# Patient Record
Sex: FEMALE | Race: WHITE | Employment: FULL TIME | URBAN - METROPOLITAN AREA
[De-identification: names, ages, dates, MRNs, and addresses within clinical notes are randomized per-mention and may not be internally consistent; named-entity substitution may affect disease eponyms.]

---

## 2023-10-07 PROBLEM — E55.9 VITAMIN D DEFICIENCY: Status: ACTIVE | Noted: 2023-10-07

## 2023-10-07 PROBLEM — L20.9 ATOPIC DERMATITIS: Status: ACTIVE | Noted: 2023-10-07

## 2023-10-07 PROBLEM — H61.21 IMPACTED CERUMEN OF RIGHT EAR: Status: ACTIVE | Noted: 2023-10-07

## 2023-10-07 PROBLEM — G43.909 MIGRAINE WITHOUT STATUS MIGRAINOSUS, NOT INTRACTABLE: Status: ACTIVE | Noted: 2023-10-07

## 2023-10-07 PROBLEM — R53.83 FATIGUE: Status: ACTIVE | Noted: 2023-10-07

## 2023-10-07 PROBLEM — F41.9 ANXIETY: Status: ACTIVE | Noted: 2023-10-07

## 2023-10-09 RX ORDER — SERTRALINE HYDROCHLORIDE 50 MG/1
1.5 TABLET, FILM COATED ORAL DAILY
COMMUNITY
Start: 2021-03-22 | End: 2023-10-19 | Stop reason: SDUPTHER

## 2023-10-09 RX ORDER — NYSTATIN 100000 U/G
OINTMENT TOPICAL
COMMUNITY
Start: 2022-10-18 | End: 2023-10-19 | Stop reason: ALTCHOICE

## 2023-10-09 RX ORDER — SUMATRIPTAN 50 MG/1
TABLET, FILM COATED ORAL
COMMUNITY
Start: 2013-01-11 | End: 2023-10-19 | Stop reason: SDUPTHER

## 2023-10-19 ENCOUNTER — OFFICE VISIT (OUTPATIENT)
Dept: PRIMARY CARE | Facility: CLINIC | Age: 38
End: 2023-10-19
Payer: COMMERCIAL

## 2023-10-19 VITALS
WEIGHT: 154 LBS | HEART RATE: 68 BPM | OXYGEN SATURATION: 100 % | DIASTOLIC BLOOD PRESSURE: 70 MMHG | BODY MASS INDEX: 27.29 KG/M2 | HEIGHT: 63 IN | SYSTOLIC BLOOD PRESSURE: 116 MMHG

## 2023-10-19 DIAGNOSIS — Z13.220 SCREENING, LIPID: ICD-10-CM

## 2023-10-19 DIAGNOSIS — Z13.31 NEGATIVE DEPRESSION SCREENING: ICD-10-CM

## 2023-10-19 DIAGNOSIS — F41.9 ANXIETY: Primary | ICD-10-CM

## 2023-10-19 DIAGNOSIS — G43.009 MIGRAINE WITHOUT AURA AND WITHOUT STATUS MIGRAINOSUS, NOT INTRACTABLE: ICD-10-CM

## 2023-10-19 DIAGNOSIS — Z00.00 HEALTHCARE MAINTENANCE: ICD-10-CM

## 2023-10-19 DIAGNOSIS — E55.9 VITAMIN D DEFICIENCY: ICD-10-CM

## 2023-10-19 DIAGNOSIS — R53.83 OTHER FATIGUE: ICD-10-CM

## 2023-10-19 PROCEDURE — 1036F TOBACCO NON-USER: CPT | Performed by: NURSE PRACTITIONER

## 2023-10-19 PROCEDURE — 99395 PREV VISIT EST AGE 18-39: CPT | Performed by: NURSE PRACTITIONER

## 2023-10-19 PROCEDURE — 96127 BRIEF EMOTIONAL/BEHAV ASSMT: CPT | Performed by: NURSE PRACTITIONER

## 2023-10-19 RX ORDER — SUMATRIPTAN 50 MG/1
50 TABLET, FILM COATED ORAL ONCE AS NEEDED
Qty: 9 TABLET | Refills: 5 | Status: SHIPPED | OUTPATIENT
Start: 2023-10-19

## 2023-10-19 RX ORDER — SERTRALINE HYDROCHLORIDE 50 MG/1
75 TABLET, FILM COATED ORAL DAILY
Qty: 135 TABLET | Refills: 3 | Status: SHIPPED | OUTPATIENT
Start: 2023-10-19 | End: 2024-01-22 | Stop reason: ALTCHOICE

## 2023-10-19 ASSESSMENT — PATIENT HEALTH QUESTIONNAIRE - PHQ9
1. LITTLE INTEREST OR PLEASURE IN DOING THINGS: NOT AT ALL
2. FEELING DOWN, DEPRESSED OR HOPELESS: NOT AT ALL
SUM OF ALL RESPONSES TO PHQ9 QUESTIONS 1 & 2: 0

## 2023-10-19 NOTE — PROGRESS NOTES
Annual Comprehensive Medical Exam:    38 y.o. female presents for annual comprehensive medical evaluation and preventive services screening.       Diet: healthy  Caffeine: 1-2 per day   Water: couple per day  Exercise: walking  Alcohol:   Tobacco: denies  Dentist: twice a year  Optometrist: yearly  Mammogram: never   Pelvic and Pap: Dr. Kamila Campa      Family history and social history reviewed.   Allowed to report any questions or concerns.     Delivered her 2nd  baby boy, Gonzalez,  vaginally 9/2/23. Bottle feeding.  Evaluated 9/23/23 for preeclampsia in postpartum period.     Anxiety:   Managed with Sertraline (Zoloft) 50 mg 1 1/2 tabs  Meeting with a counselor every 2 weeks  Denies thoughts of suicide or homicide.      Migraines:   Well controlled.   Using Sumatriptan if needed         Past Medical History:   Diagnosis Date    Acute pharyngitis, unspecified 02/19/2013    Pharyngitis    Anxiety disorder, unspecified 10/18/2022    Anxiety    Cellulitis of right axilla     Cellulitis of axilla, right    Encounter for other general counseling and advice on contraception     Family planning    Generalized hyperhidrosis     Hyperhidrosis    Hemangioma unspecified site     Hemangioma    Iron deficiency anemia, unspecified 02/19/2013    Iron deficiency anemia    Irritable bowel syndrome without diarrhea 02/19/2013    Irritable bowel syndrome    Migraine, unspecified, not intractable, without status migrainosus 10/18/2022    Migraine without status migrainosus, not intractable    Other malaise     Malaise    Personal history of other (healed) physical injury and trauma     History of sprain of wrist    Personal history of other diseases of the nervous system and sense organs     History of migraine headaches    Personal history of other diseases of the nervous system and sense organs     History of conjunctivitis    Personal history of other diseases of the respiratory system     History of bronchitis    Personal history  of other diseases of the respiratory system 06/26/2014    History of viral pharyngitis    Personal history of other diseases of the respiratory system     Personal history of sinusitis    Personal history of other infectious and parasitic diseases 05/14/2013    History of tinea corporis    Personal history of other mental and behavioral disorders 04/29/2013    History of depression    Personal history of other specified conditions     History of fatigue    Personal history of urinary (tract) infections     History of urinary tract infection    Right lower quadrant pain     RLQ abdominal pain    Unspecified conjunctivitis 09/04/2014    Conjunctivitis of left eye      Past Surgical History:   Procedure Laterality Date    MOUTH SURGERY  08/27/2013    Oral Surgery Tooth Extraction    OTHER SURGICAL HISTORY  09/21/2020    Exploratory laparoscopy     Family History   Problem Relation Name Age of Onset    Arthritis Mother      Hypertension Father      Diabetes Mother's Brother        Social History     Socioeconomic History    Marital status:      Spouse name: Not on file    Number of children: 2    Years of education: Not on file    Highest education level: Not on file   Occupational History    Not on file   Tobacco Use    Smoking status: Never    Smokeless tobacco: Never   Substance and Sexual Activity    Alcohol use: Yes     Alcohol/week: 3.0 standard drinks of alcohol     Types: 3 Standard drinks or equivalent per week    Drug use: Never    Sexual activity: Not on file   Other Topics Concern    Not on file   Social History Narrative    Not on file     Social Determinants of Health     Financial Resource Strain: Not on file   Food Insecurity: Not on file   Transportation Needs: Not on file   Physical Activity: Not on file   Stress: Not on file   Social Connections: Not on file   Intimate Partner Violence: Not on file   Housing Stability: Not on file       Current Outpatient Medications on File Prior to Visit  "  Medication Sig Dispense Refill    sertraline (Zoloft) 50 mg tablet Take 1.5 tablets (75 mg) by mouth once daily.      SUMAtriptan (Imitrex) 50 mg tablet Take by mouth.      [DISCONTINUED] nystatin (Mycostatin) ointment Apply underbreast for tx of candiadiasis 2-3  times per day       No current facility-administered medications on file prior to visit.       No Known Allergies    ROS:   Refer to HPI  Denies fever, CP, SOB, headaches or GI upset    Visit Vitals  /70   Pulse 68   Ht 1.6 m (5' 3\")   Wt 69.9 kg (154 lb)   SpO2 100%   BMI 27.28 kg/m²   Smoking Status Never   BSA 1.76 m²        Physical Exam  Gen: Alert and oriented x3 female in no acute distress.  HEENT: Head is normocephalic.  Pupils equal and reactive to light.  Tympanic membranes are clear.  Neck is supple without adenopathy or carotid bruits.  Heart: Regular rate and rhythm without murmurs.  Lungs: Clear to auscultation bilaterally.  Breast:       Deferred to Gyn  Pelvic:           Deferred to Gyn   Abdomen: Soft with normal bowel sounds.  No masses or pain to palpation.  No bruits auscultated.  Extremities: Good range of motion of all joints.  No significant edema. Pedal pulses +1-2/4  Neuro: No signs of focal neurologic deficit.  No tremor.  Speech and hearing are normal.  DTRs +3/4;  Muscle Strength +5/5.  Musculoskeletal: Spine with good ROM.  Leg lengths are equal.  Skin: No significant or irregular nevi visualized.  Psych: normal affect.  No suicidal ideation.  Good judgment and insight.    Diagnosis/Plan:     1. Healthcare maintenance    - Comprehensive metabolic panel; Future  - CBC; Future  - Lipid panel; Future  - TSH with reflex to Free T4 if abnormal; Future  - Urinalysis with Reflex Microscopic; Future    2. Anxiety  Stable.   Continue current medication/treatment plan.     Aware at any time if thoughts of suicide or homicide are present contact medical services immediately.    - sertraline (Zoloft) 50 mg tablet; Take 1.5 tablets " (75 mg) by mouth once daily.  Dispense: 135 tablet; Refill: 3    3. Migraine without aura and without status migrainosus, not intractable  Stable.  Continue current medications.  - SUMAtriptan (Imitrex) 50 mg tablet; Take 1 tablet (50 mg) by mouth 1 time if needed for migraine for up to 1 dose.  Dispense: 9 tablet; Refill: 5    4. Vitamin D deficiency  A prescription for vitamin D will be sent to your pharmacy.  Begin or continue a multivitamin.    - Vitamin D 25-Hydroxy,Total (for eval of Vitamin D levels); Future    5. Negative depression screening:   Depression screening completed.       Medications refills will be completed as discussed.     Any labs or testing that is ordered will be reviewed and the results will be in your chart .   You can review these via  [employer].     Follow up 1 year for CPE  Prescriptions will not be filled unless you are compliant with your follow-up appointments or have a follow-up appointment scheduled as per the instruction of your provider. Refills for medications should be requested at the time of your office visit.     Please allow one week for refill requests to be completed.     Contact office with any questions or concerns.   Preferred communication is via  [employer]  Please contact [email or web address] if having issues with  [employer]    Kandace ROSS-[facility]  [address], [address]  [place] [address]  Phone: [phone]    **Charting was completed using voice recognition technology and may include unintended errors**

## 2024-01-18 PROBLEM — H53.9 ABNORMAL VISION: Status: ACTIVE | Noted: 2023-09-23

## 2024-01-18 PROBLEM — Z98.890 H/O ABDOMINAL SURGERY: Status: ACTIVE | Noted: 2020-08-31

## 2024-01-18 PROBLEM — Z86.69 HISTORY OF MIGRAINE: Status: ACTIVE | Noted: 2023-09-23

## 2024-01-18 PROBLEM — O09.521 AMA (ADVANCED MATERNAL AGE) MULTIGRAVIDA 35+, FIRST TRIMESTER (HHS-HCC): Status: ACTIVE | Noted: 2023-01-24

## 2024-01-18 PROBLEM — J45.909 ASTHMA (HHS-HCC): Status: ACTIVE | Noted: 2023-09-23

## 2024-01-18 PROBLEM — Z86.59 HISTORY OF DEPRESSION: Status: ACTIVE | Noted: 2023-09-23

## 2024-01-22 ENCOUNTER — OFFICE VISIT (OUTPATIENT)
Dept: PRIMARY CARE | Facility: CLINIC | Age: 39
End: 2024-01-22
Payer: COMMERCIAL

## 2024-01-22 VITALS
SYSTOLIC BLOOD PRESSURE: 124 MMHG | OXYGEN SATURATION: 97 % | DIASTOLIC BLOOD PRESSURE: 76 MMHG | WEIGHT: 156 LBS | BODY MASS INDEX: 27.63 KG/M2 | HEART RATE: 76 BPM

## 2024-01-22 DIAGNOSIS — F41.9 ANXIETY: Primary | ICD-10-CM

## 2024-01-22 PROBLEM — O09.521 AMA (ADVANCED MATERNAL AGE) MULTIGRAVIDA 35+, FIRST TRIMESTER (HHS-HCC): Status: RESOLVED | Noted: 2023-01-24 | Resolved: 2024-01-22

## 2024-01-22 PROBLEM — Z98.890 H/O ABDOMINAL SURGERY: Status: RESOLVED | Noted: 2020-08-31 | Resolved: 2024-01-22

## 2024-01-22 PROBLEM — Z86.59 HISTORY OF DEPRESSION: Status: RESOLVED | Noted: 2023-09-23 | Resolved: 2024-01-22

## 2024-01-22 PROCEDURE — 99213 OFFICE O/P EST LOW 20 MIN: CPT | Performed by: NURSE PRACTITIONER

## 2024-01-22 PROCEDURE — 1036F TOBACCO NON-USER: CPT | Performed by: NURSE PRACTITIONER

## 2024-01-22 RX ORDER — SERTRALINE HYDROCHLORIDE 100 MG/1
100 TABLET, FILM COATED ORAL DAILY
Qty: 90 TABLET | Refills: 1 | Status: SHIPPED | OUTPATIENT
Start: 2024-01-22

## 2024-01-22 RX ORDER — NORETHINDRONE 0.35 MG/1
1 TABLET ORAL DAILY
COMMUNITY

## 2024-01-22 NOTE — PROGRESS NOTES
Subjective   Patient ID: Radha Rosales is a 38 y.o. female who presents for Anxiety (Discuss increasing medication).    HPI   Pt states that over the past few weeks her anxiety has increased.   She has started back on birth control with has affected her hormones.   Both of her babies were sick for at least +3 days  She has been speaking with her therapist  Denies thoughts of suicide or homicide      Review of Systems    Objective   /76   Pulse 76   Wt 70.8 kg (156 lb)   SpO2 97%   BMI 27.63 kg/m²     Physical Exam    Alert and oriented x 3  Appears healthy  Does not appear/sound dyspneic with conversation  Speech clear.  Hearing adequate.  Psych: Normal affect. Good judgment and insight.       Assessment/Plan     1. Anxiety  Discussed ways to help with anxiety  Continue to meet with counselor  Aware at any time if thoughts of suicide or homicide are present contact medical services immediately.    - sertraline (Zoloft) 100 mg tablet; Take 1 tablet (100 mg) by mouth once daily.  Dispense: 90 tablet; Refill: 1      Medications refills will be completed as discussed.     Any labs or testing that is ordered will be reviewed and the results will be in your chart .   You can review these via  [employer].     Prescriptions will not be filled unless you are compliant with your follow-up appointments or have a follow-up appointment scheduled as per the instruction of your provider. Refills for medications should be requested at the time of your office visit.     Please allow one week for refill requests to be completed.     Contact office with any questions or concerns.   Preferred communication is via  [employer]  Please contact [email or web address] if having issues with  [employer]    Kandace Gaviria APRN-[facility]  [address], [address]  [place] [address]  Phone: [phone]    **Charting was completed using voice recognition technology and may  include unintended errors**

## 2024-02-28 ENCOUNTER — OFFICE VISIT (OUTPATIENT)
Dept: PRIMARY CARE | Facility: CLINIC | Age: 39
End: 2024-02-28
Payer: COMMERCIAL

## 2024-02-28 VITALS
HEART RATE: 79 BPM | SYSTOLIC BLOOD PRESSURE: 110 MMHG | OXYGEN SATURATION: 98 % | WEIGHT: 153 LBS | BODY MASS INDEX: 27.11 KG/M2 | HEIGHT: 63 IN | DIASTOLIC BLOOD PRESSURE: 62 MMHG

## 2024-02-28 DIAGNOSIS — J45.20 MILD INTERMITTENT ASTHMA WITHOUT COMPLICATION (HHS-HCC): ICD-10-CM

## 2024-02-28 DIAGNOSIS — N39.0 URINARY TRACT INFECTION WITHOUT HEMATURIA, SITE UNSPECIFIED: Primary | ICD-10-CM

## 2024-02-28 DIAGNOSIS — R30.0 DYSURIA: ICD-10-CM

## 2024-02-28 LAB
POC APPEARANCE, URINE: ABNORMAL
POC BILIRUBIN, URINE: NEGATIVE
POC BLOOD, URINE: ABNORMAL
POC COLOR, URINE: ABNORMAL
POC GLUCOSE, URINE: NEGATIVE MG/DL
POC KETONES, URINE: NEGATIVE MG/DL
POC LEUKOCYTES, URINE: ABNORMAL
POC NITRITE,URINE: NEGATIVE
POC PH, URINE: 6.5 PH
POC PROTEIN, URINE: ABNORMAL MG/DL
POC SPECIFIC GRAVITY, URINE: >=1.03
POC UROBILINOGEN, URINE: 0.2 EU/DL

## 2024-02-28 PROCEDURE — 1036F TOBACCO NON-USER: CPT | Performed by: NURSE PRACTITIONER

## 2024-02-28 PROCEDURE — 81003 URINALYSIS AUTO W/O SCOPE: CPT | Performed by: NURSE PRACTITIONER

## 2024-02-28 PROCEDURE — 87086 URINE CULTURE/COLONY COUNT: CPT

## 2024-02-28 PROCEDURE — 99214 OFFICE O/P EST MOD 30 MIN: CPT | Performed by: NURSE PRACTITIONER

## 2024-02-28 RX ORDER — NITROFURANTOIN 25; 75 MG/1; MG/1
100 CAPSULE ORAL 2 TIMES DAILY
Qty: 10 CAPSULE | Refills: 0 | Status: SHIPPED | OUTPATIENT
Start: 2024-02-28 | End: 2024-03-04

## 2024-02-28 ASSESSMENT — PATIENT HEALTH QUESTIONNAIRE - PHQ9
2. FEELING DOWN, DEPRESSED OR HOPELESS: NOT AT ALL
1. LITTLE INTEREST OR PLEASURE IN DOING THINGS: NOT AT ALL
SUM OF ALL RESPONSES TO PHQ9 QUESTIONS 1 & 2: 0

## 2024-02-28 NOTE — PROGRESS NOTES
"Subjective   Patient ID: Radha Rosales is a 38 y.o. female who presents for possible UTI.    HPI     Presents today for acute care visit.    She has been complaining of the following since:   2/22/24    Burning with urination: yes  Urinary frequency: yes  Urinary urgency: yes  Hematuria: finishing period  Back pain: denies  Nausea:denies  Vomiting: denies  Fever.chills: denies  Vaginal discharge: finishing menses    Treatments tried:   Increased water  Cranberry juice    Asthma:   Stable   Review of Systems    Objective   /62   Pulse 79   Ht 1.6 m (5' 3\")   Wt 69.4 kg (153 lb)   SpO2 98%   BMI 27.10 kg/m²     Physical Exam    Alert and oriented x 3  Appears healthy  Speech clear.  Hearing adequate.  Psych: Normal affect. Good judgment and insight.     Reviewed UA results     Assessment/Plan     1. Dysuria, Urinary tract infection without hematuria, site unspecified  An antibiotic will be prescribed. Full dose should be completed.   Eat yogurt or take a probiotic (not within the hour of taking the antibiotic) to assist with stomach upset.   A urine culture and sensitivity will be reviewed and the patient will be notified if antibiotic changes are appropriate.   Increase fluid intake throughout the day.   You can drink cranberry juice or take cranberry tablets and/or Vitamin C tablets.   Discussed proper personal hygiene techniques and the need to empty bladder frequently.   Aware to call office if symptoms do not resolve in 3 days.    - POCT UA Automated manually resulted  - Urine Culture; Future  - Urine Culture  - nitrofurantoin, macrocrystal-monohydrate, (Macrobid) 100 mg capsule; Take 1 capsule (100 mg) by mouth 2 times a day for 5 days.  Dispense: 10 capsule; Refill: 0    2. Mild intermittent asthma without complication  stable      Contact office with any questions or concerns.   Preferred communication is via  [employer]  Please contact [email or web address] if having issues with  " Karl Gaviria [facility]  [address], [address]  [place] [address]  Phone: [phone]    **Charting was completed using voice recognition technology and may include unintended errors**

## 2024-02-29 LAB — BACTERIA UR CULT: NORMAL

## 2024-07-23 ENCOUNTER — TELEPHONE (OUTPATIENT)
Dept: PRIMARY CARE | Facility: CLINIC | Age: 39
End: 2024-07-23
Payer: COMMERCIAL

## 2024-07-23 DIAGNOSIS — F41.9 ANXIETY: ICD-10-CM

## 2024-07-23 RX ORDER — SERTRALINE HYDROCHLORIDE 100 MG/1
100 TABLET, FILM COATED ORAL DAILY
Qty: 90 TABLET | Refills: 0 | Status: SHIPPED | OUTPATIENT
Start: 2024-07-23

## 2024-07-23 NOTE — TELEPHONE ENCOUNTER
REFILL  MEDICATION:     Sertraline 100 MG; Take 1 tablet once daily.     PHARM: [facility]   PHARM NUMBER: [phone]    LR: 1/22/24      90 tablets with 1 refill   LV: 1/22/24  NV: No future appt.

## 2024-07-31 ENCOUNTER — APPOINTMENT (OUTPATIENT)
Dept: PRIMARY CARE | Facility: CLINIC | Age: 39
End: 2024-07-31
Payer: COMMERCIAL

## 2024-07-31 VITALS
SYSTOLIC BLOOD PRESSURE: 124 MMHG | DIASTOLIC BLOOD PRESSURE: 72 MMHG | HEART RATE: 72 BPM | HEIGHT: 63 IN | WEIGHT: 152 LBS | BODY MASS INDEX: 26.93 KG/M2 | OXYGEN SATURATION: 99 %

## 2024-07-31 DIAGNOSIS — S16.1XXA STRAIN OF NECK MUSCLE, INITIAL ENCOUNTER: Primary | ICD-10-CM

## 2024-07-31 DIAGNOSIS — H53.9 VISION ABNORMALITIES: ICD-10-CM

## 2024-07-31 PROCEDURE — 99214 OFFICE O/P EST MOD 30 MIN: CPT | Performed by: NURSE PRACTITIONER

## 2024-07-31 PROCEDURE — 3008F BODY MASS INDEX DOCD: CPT | Performed by: NURSE PRACTITIONER

## 2024-07-31 RX ORDER — KETOCONAZOLE 20 MG/ML
SHAMPOO, SUSPENSION TOPICAL
COMMUNITY
Start: 2024-07-24

## 2024-07-31 RX ORDER — AMMONIUM LACTATE 12 G/100G
LOTION TOPICAL
COMMUNITY
Start: 2024-07-24

## 2024-07-31 RX ORDER — CLOTRIMAZOLE AND BETAMETHASONE DIPROPIONATE 10; .64 MG/G; MG/G
CREAM TOPICAL
COMMUNITY
Start: 2024-07-24

## 2024-07-31 RX ORDER — PREDNISONE 20 MG/1
40 TABLET ORAL DAILY
Qty: 10 TABLET | Refills: 0 | Status: SHIPPED | OUTPATIENT
Start: 2024-07-31 | End: 2024-08-05

## 2024-07-31 RX ORDER — CYCLOBENZAPRINE HCL 10 MG
10 TABLET ORAL NIGHTLY PRN
Qty: 30 TABLET | Refills: 0 | Status: SHIPPED | OUTPATIENT
Start: 2024-07-31 | End: 2024-09-29

## 2024-07-31 RX ORDER — CLINDAMYCIN PHOSPHATE 10 UG/ML
LOTION TOPICAL
COMMUNITY
Start: 2024-07-24

## 2024-07-31 ASSESSMENT — PATIENT HEALTH QUESTIONNAIRE - PHQ9
1. LITTLE INTEREST OR PLEASURE IN DOING THINGS: NOT AT ALL
SUM OF ALL RESPONSES TO PHQ9 QUESTIONS 1 & 2: 0
2. FEELING DOWN, DEPRESSED OR HOPELESS: NOT AT ALL

## 2024-07-31 NOTE — PROGRESS NOTES
"Subjective   Patient ID: Radha Rosales is a 39 y.o. female who presents for left sided neck pain.    HPI     Past couple months she has been having discomfort to the posterior left side of her neck.   States she just woke up and noticed pain  Area can feel stiff at times   Certain neck movements causes her to feel a pulling sensation of her neck   She did not injury herself  Right handed    Experienced a \"flash\" in her left lateral eye  She was evaluated by eye specialist-no cause of flash reported  Still can see a flash when she looks far to left    Past Medical History:   Diagnosis Date    Acute pharyngitis, unspecified 02/19/2013    Pharyngitis    AMA (advanced maternal age) multigravida 35+, first trimester ([facility]) 01/24/2023    Anxiety disorder, unspecified 10/18/2022    Anxiety    Cellulitis of right axilla     Cellulitis of axilla, right    Eclampsia in puerperium ([facility]) 09/23/2023    Encounter for other general counseling and advice on contraception     Family planning    Generalized hyperhidrosis     Hyperhidrosis    H/O abdominal surgery 08/31/2020    Formatting of this note might be different from the original. G1 - 11 week was suspect to have heterotopic, s/p MRI and laparoscopy - no heterotopic Laparoscopic    Hemangioma unspecified site     Hemangioma    History of depression 09/23/2023    Formatting of this note might be different from the original. Comment on above: Added by Problem List Migration; 2013-2-19;    Iron deficiency anemia, unspecified 02/19/2013    Iron deficiency anemia    Irritable bowel syndrome without diarrhea 02/19/2013    Irritable bowel syndrome    Migraine, unspecified, not intractable, without status migrainosus 10/18/2022    Migraine without status migrainosus, not intractable    Other malaise     Malaise    Personal history of other (healed) physical injury and trauma     History of sprain of wrist    Personal history of other diseases of the nervous system and " "sense organs     History of migraine headaches    Personal history of other diseases of the nervous system and sense organs     History of conjunctivitis    Personal history of other diseases of the respiratory system     History of bronchitis    Personal history of other diseases of the respiratory system 06/26/2014    History of viral pharyngitis    Personal history of other diseases of the respiratory system     Personal history of sinusitis    Personal history of other infectious and parasitic diseases 05/14/2013    History of tinea corporis    Personal history of other mental and behavioral disorders 04/29/2013    History of depression    Personal history of other specified conditions     History of fatigue    Personal history of urinary (tract) infections     History of urinary tract infection    Right lower quadrant pain     RLQ abdominal pain    Unspecified conjunctivitis 09/04/2014    Conjunctivitis of left eye         Review of Systems    Objective   /72   Pulse 72   Ht 1.6 m (5' 3\")   Wt 68.9 kg (152 lb)   SpO2 99%   BMI 26.93 kg/m²     Physical Exam    Alert and oriented x 3  Appears healthy  Does not appear/sound dyspneic with conversation  Speech clear.  Hearing adequate.  Psych: Normal affect. Good judgment and insight.   Full ROM of neck but experiences discomfort  Strength equal in both arms    Assessment/Plan        1. Strain of neck muscle, initial encounter  - predniSONE (Deltasone) 20 mg tablet; Take 2 tablets (40 mg) by mouth once daily for 5 days.  Dispense: 10 tablet; Refill: 0  - cyclobenzaprine (Flexeril) 10 mg tablet; Take 1 tablet (10 mg) by mouth as needed at bedtime for muscle spasms.  Dispense: 30 tablet; Refill: 0  - Referral to Physical Therapy; Future    2. Vision abnormalities  Follow up with eye specialist     Contact office with any questions or concerns.   Preferred communication is via  [employer] [address]      Call  Services: [phone] to assist with " scheduling.      Kandace Gaviria [facility]  [address], [address]  [place] [address]  Phone: [phone]    **Charting was completed using voice recognition technology and may include unintended errors**

## 2024-10-25 ENCOUNTER — PATIENT MESSAGE (OUTPATIENT)
Dept: PRIMARY CARE | Facility: CLINIC | Age: 39
End: 2024-10-25
Payer: COMMERCIAL

## 2024-10-25 DIAGNOSIS — F41.9 ANXIETY: ICD-10-CM

## 2024-10-26 RX ORDER — SERTRALINE HYDROCHLORIDE 100 MG/1
100 TABLET, FILM COATED ORAL DAILY
Qty: 90 TABLET | Refills: 0 | Status: SHIPPED | OUTPATIENT
Start: 2024-10-26

## 2024-11-06 ENCOUNTER — APPOINTMENT (OUTPATIENT)
Dept: PHYSICAL THERAPY | Facility: CLINIC | Age: 39
End: 2024-11-06
Payer: COMMERCIAL

## 2024-11-13 ENCOUNTER — APPOINTMENT (OUTPATIENT)
Dept: PHYSICAL THERAPY | Facility: CLINIC | Age: 39
End: 2024-11-13
Payer: COMMERCIAL

## 2024-11-20 ENCOUNTER — APPOINTMENT (OUTPATIENT)
Dept: PHYSICAL THERAPY | Facility: CLINIC | Age: 39
End: 2024-11-20
Payer: COMMERCIAL

## 2024-12-04 ENCOUNTER — APPOINTMENT (OUTPATIENT)
Dept: PHYSICAL THERAPY | Facility: CLINIC | Age: 39
End: 2024-12-04
Payer: COMMERCIAL

## 2024-12-19 ENCOUNTER — APPOINTMENT (OUTPATIENT)
Dept: PRIMARY CARE | Facility: CLINIC | Age: 39
End: 2024-12-19
Payer: COMMERCIAL

## 2024-12-30 ENCOUNTER — APPOINTMENT (OUTPATIENT)
Dept: PRIMARY CARE | Facility: CLINIC | Age: 39
End: 2024-12-30
Payer: COMMERCIAL

## 2024-12-30 VITALS
BODY MASS INDEX: 28.17 KG/M2 | OXYGEN SATURATION: 99 % | HEIGHT: 63 IN | DIASTOLIC BLOOD PRESSURE: 60 MMHG | WEIGHT: 159 LBS | SYSTOLIC BLOOD PRESSURE: 112 MMHG | HEART RATE: 87 BPM

## 2024-12-30 DIAGNOSIS — Z11.59 NEED FOR HEPATITIS C SCREENING TEST: ICD-10-CM

## 2024-12-30 DIAGNOSIS — Z00.00 HEALTHCARE MAINTENANCE: Primary | ICD-10-CM

## 2024-12-30 DIAGNOSIS — J06.9 UPPER RESPIRATORY TRACT INFECTION, UNSPECIFIED TYPE: ICD-10-CM

## 2024-12-30 DIAGNOSIS — F41.9 ANXIETY: ICD-10-CM

## 2024-12-30 DIAGNOSIS — G43.009 MIGRAINE WITHOUT AURA AND WITHOUT STATUS MIGRAINOSUS, NOT INTRACTABLE: ICD-10-CM

## 2024-12-30 DIAGNOSIS — E55.9 VITAMIN D DEFICIENCY: ICD-10-CM

## 2024-12-30 PROCEDURE — 99214 OFFICE O/P EST MOD 30 MIN: CPT | Performed by: NURSE PRACTITIONER

## 2024-12-30 PROCEDURE — 99395 PREV VISIT EST AGE 18-39: CPT | Performed by: NURSE PRACTITIONER

## 2024-12-30 PROCEDURE — 3008F BODY MASS INDEX DOCD: CPT | Performed by: NURSE PRACTITIONER

## 2024-12-30 RX ORDER — SUMATRIPTAN SUCCINATE 50 MG/1
50 TABLET ORAL ONCE AS NEEDED
Qty: 9 TABLET | Refills: 5 | Status: SHIPPED | OUTPATIENT
Start: 2024-12-30

## 2024-12-30 RX ORDER — AZITHROMYCIN 250 MG/1
TABLET, FILM COATED ORAL
Qty: 6 TABLET | Refills: 0 | Status: SHIPPED | OUTPATIENT
Start: 2024-12-30 | End: 2025-01-04

## 2024-12-30 RX ORDER — SERTRALINE HYDROCHLORIDE 100 MG/1
100 TABLET, FILM COATED ORAL DAILY
Qty: 90 TABLET | Refills: 3 | Status: SHIPPED | OUTPATIENT
Start: 2024-12-30

## 2024-12-30 ASSESSMENT — PROMIS GLOBAL HEALTH SCALE
RATE_PHYSICAL_HEALTH: GOOD
RATE_GENERAL_HEALTH: GOOD
RATE_AVERAGE_FATIGUE: MILD
RATE_AVERAGE_PAIN: 0
CARRYOUT_PHYSICAL_ACTIVITIES: COMPLETELY
RATE_QUALITY_OF_LIFE: GOOD
CARRYOUT_SOCIAL_ACTIVITIES: VERY GOOD
RATE_MENTAL_HEALTH: FAIR
EMOTIONAL_PROBLEMS: SOMETIMES
RATE_SOCIAL_SATISFACTION: GOOD

## 2024-12-30 NOTE — PROGRESS NOTES
Annual Comprehensive Medical Exam:    39 y.o. female presents for annual comprehensive medical evaluation and preventive services screening.       Diet: healthy  Caffeine: 1-2 per day   Water: 2 per day  Exercise: active with children  Alcohol: 3-5 per week  Tobacco: denies  Mammogram: never   Pelvic and Pap: Dr. Kamila Campa, scheduled 1/11/25 [facility]     Family history and social history reviewed.   Allowed to report any questions or concerns.    Anxiety:   Med:  Sertraline (Zoloft)   Meeting with a counselor every 2 weeks  Denies thoughts of suicide or homicide.   Considering meeting with a psychiatrist      Migraines:   Well controlled.   Using Sumatriptan if needed    Cough and cold times 1 month       Past Medical History:   Diagnosis Date    Acute pharyngitis, unspecified 02/19/2013    Pharyngitis    AMA (advanced maternal age) multigravida 35+, first trimester ([facility]) 01/24/2023    Anxiety disorder, unspecified 10/18/2022    Anxiety    Cellulitis of right axilla     Cellulitis of axilla, right    Eclampsia in puerperium ([facility]) 09/23/2023    Encounter for other general counseling and advice on contraception     Family planning    Generalized hyperhidrosis     Hyperhidrosis    H/O abdominal surgery 08/31/2020    Formatting of this note might be different from the original. G1 - 11 week was suspect to have heterotopic, s/p MRI and laparoscopy - no heterotopic Laparoscopic    Hemangioma unspecified site     Hemangioma    History of depression 09/23/2023    Formatting of this note might be different from the original. Comment on above: Added by Problem List Migration; 2013-2-19;    Iron deficiency anemia, unspecified 02/19/2013    Iron deficiency anemia    Irritable bowel syndrome without diarrhea 02/19/2013    Irritable bowel syndrome    Migraine, unspecified, not intractable, without status migrainosus 10/18/2022    Migraine without status migrainosus, not intractable    Other malaise     Malaise     Personal history of other (healed) physical injury and trauma     History of sprain of wrist    Personal history of other diseases of the nervous system and sense organs     History of migraine headaches    Personal history of other diseases of the nervous system and sense organs     History of conjunctivitis    Personal history of other diseases of the respiratory system     History of bronchitis    Personal history of other diseases of the respiratory system 06/26/2014    History of viral pharyngitis    Personal history of other diseases of the respiratory system     Personal history of sinusitis    Personal history of other infectious and parasitic diseases 05/14/2013    History of tinea corporis    Personal history of other mental and behavioral disorders 04/29/2013    History of depression    Personal history of other specified conditions     History of fatigue    Personal history of urinary (tract) infections     History of urinary tract infection    Right lower quadrant pain     RLQ abdominal pain    Unspecified conjunctivitis 09/04/2014    Conjunctivitis of left eye      Past Surgical History:   Procedure Laterality Date    MOUTH SURGERY  08/27/2013    Oral Surgery Tooth Extraction    OTHER SURGICAL HISTORY  09/21/2020    Exploratory laparoscopy     Family History   Problem Relation Name Age of Onset    Arthritis Mother      Hypertension Father      Diabetes Mother's Brother        Social History     Socioeconomic History    Marital status:      Spouse name: Not on file    Number of children: 2    Years of education: Not on file    Highest education level: Not on file   Occupational History    Not on file   Tobacco Use    Smoking status: Never    Smokeless tobacco: Never   Substance and Sexual Activity    Alcohol use: Yes     Alcohol/week: 3.0 standard drinks of alcohol     Types: 3 Standard drinks or equivalent per week    Drug use: Never    Sexual activity: Not on file   Other Topics Concern     "Not on file   Social History Narrative    Not on file     Social Drivers of Health     Financial Resource Strain: Not on file   Food Insecurity: Not on file   Transportation Needs: Not on file   Physical Activity: Not on file   Stress: Not on file   Social Connections: Not on file   Intimate Partner Violence: Not on file   Housing Stability: Not on file       Current Outpatient Medications on File Prior to Visit   Medication Sig Dispense Refill    ammonium lactate (Lac-Hydrin) 12 % lotion APPLY A THIN LAYER TO KERATOSIS PILARIS DAILY      clindamycin (Cleocin T) 1 % lotion APPLY TO AFFECTED AREAS UP TO TWICE DAILY AS NEEDED      clotrimazole-betamethasone (Lotrisone) cream APPLY TO RASH TWICE DAILY FOR 10 DAYS. REPEAT AS NEEDED FOR FLARES.      cyclobenzaprine (Flexeril) 10 mg tablet Take 1 tablet (10 mg) by mouth as needed at bedtime for muscle spasms. 30 tablet 0    Incassia 0.35 mg tablet Take 1 tablet (0.35 mg) by mouth once daily.      ketoconazole (NIZOral) 2 % shampoo       sertraline (Zoloft) 100 mg tablet Take 1 tablet (100 mg) by mouth once daily. 90 tablet 0    SUMAtriptan (Imitrex) 50 mg tablet Take 1 tablet (50 mg) by mouth 1 time if needed for migraine for up to 1 dose. 9 tablet 5     No current facility-administered medications on file prior to visit.       No Known Allergies      Visit Vitals  /60   Pulse 87   Ht 1.6 m (5' 3\")   Wt 72.1 kg (159 lb)   SpO2 99%   BMI 28.17 kg/m²   Smoking Status Never   BSA 1.79 m²        Physical Exam    Gen: Alert and oriented x3 female in no acute distress.  HEENT: Head is normocephalic.  Neck is supple without carotid bruits  Heart: Regular rate and rhythm without murmurs.  Lungs: Clear to auscultation bilaterally.  Breast:            Deferred to Gyn  Pelvic:             Deferred to Gyn   Abdomen: Soft with normal bowel sounds.  No masses or pain to palpation. .  Extremities: Good range of motion of all joints.  No significant edema. Pedal pulses " +1-2/4  Neuro: No signs of focal neurologic deficit.  No tremor.  Speech and hearing are normal.  DTRs +3/4;  Muscle Strength +5/5.  Musculoskeletal: Spine with good ROM.  Leg lengths are equal.  Skin: No significant or irregular nevi visualized.  Psych: normal affect.  No suicidal ideation.  Good judgment and insight.      Diagnosis/Plan:     1. Healthcare maintenance (Primary)    - Comprehensive metabolic panel; Future  - CBC; Future  - Lipid panel; Future  - TSH with reflex to Free T4 if abnormal; Future  - Urinalysis with Reflex Microscopic; Future  - Hepatitis C antibody; Future    2. Vitamin D deficiency  Vitamin D lab ordered. If your level is low,  a script for a weekly dose of Vitamin D will be sent to pharmacy. You should start or continue a Multivitamin.    - Vitamin D 25-Hydroxy,Total (for eval of Vitamin D levels); Future    3. Need for hepatitis C screening test    - Hepatitis C antibody; Future    4. Migraine without aura and without status migrainosus, not intractable  Stable.  Continue current medications.  - SUMAtriptan (Imitrex) 50 mg tablet; Take 1 tablet (50 mg) by mouth 1 time if needed for migraine.  Dispense: 9 tablet; Refill: 5    5. Anxiety  Stable.   Continue current medication/treatment plan.     Aware at any time if thoughts of suicide or homicide are present contact medical services immediately.    - sertraline (Zoloft) 100 mg tablet; Take 1 tablet (100 mg) by mouth once daily.  Dispense: 90 tablet; Refill: 3    6. Upper respiratory tract infection, unspecified type    Complete prescribed antibiotics as directed. Eat yogurt or take a probiotic (not within the hour of taking the antibiotic) while taking antibiotics.   Increase fluid intake throughout the day.    Irrigate your nose with saline spray 2-4 times per day.   Antihistamine nasal sprays (like Azelastine) also help with nasal congestion and sinus infection. Side effects of Azelastine include an occasional bitter taste. You can  reduce the bitter taste by leaning forward, directing the spray toward the outside of your nose, and not sniffing for a few minutes.    Mucinex  DM for cough  Contact the office if not improving after completing the antibiotics.     - azithromycin (Zithromax) 250 mg tablet; Take 2 tablets (500 mg) by mouth once daily for 1 day, THEN 1 tablet (250 mg) once daily for 4 days. Take 2 tabs (500 mg) by mouth today, than 1 daily for 4 days..  Dispense: 6 tablet; Refill: 0        Medications refills will be completed as discussed.     Any labs or testing that is ordered will be reviewed and the results will be in your chart .   You can review these via  [employer].     Follow up 1 year for CPE  Prescriptions will not be filled unless you are compliant with your follow-up appointments or have a follow-up appointment scheduled as per the instruction of your provider. Refills for medications should be requested at the time of your office visit.     Please allow one week for refill requests to be completed.     Contact office with any questions or concerns.   Preferred communication is via  [employer]  Please contact [email or web address] if having issues with  [employer]    [facility]  [address], [address]  [place] [address]  Phone: [phone]    **Charting was completed using voice recognition technology and may include unintended errors**                    Abdominal Pain, N/V/D

## 2025-01-03 PROBLEM — J06.9 UPPER RESPIRATORY TRACT INFECTION: Status: ACTIVE | Noted: 2025-01-03

## 2026-01-05 ENCOUNTER — APPOINTMENT (OUTPATIENT)
Dept: PRIMARY CARE | Facility: CLINIC | Age: 41
End: 2026-01-05
Payer: COMMERCIAL